# Patient Record
Sex: MALE | Race: BLACK OR AFRICAN AMERICAN | Employment: OTHER | URBAN - METROPOLITAN AREA
[De-identification: names, ages, dates, MRNs, and addresses within clinical notes are randomized per-mention and may not be internally consistent; named-entity substitution may affect disease eponyms.]

---

## 2019-05-02 ENCOUNTER — THERAPY VISIT (OUTPATIENT)
Dept: PHYSICAL THERAPY | Facility: CLINIC | Age: 74
End: 2019-05-02
Payer: COMMERCIAL

## 2019-05-02 DIAGNOSIS — M54.2 NECK PAIN: ICD-10-CM

## 2019-05-02 PROCEDURE — 97112 NEUROMUSCULAR REEDUCATION: CPT | Mod: GP | Performed by: PHYSICAL THERAPIST

## 2019-05-02 PROCEDURE — 97161 PT EVAL LOW COMPLEX 20 MIN: CPT | Mod: GP | Performed by: PHYSICAL THERAPIST

## 2019-05-02 PROCEDURE — 97110 THERAPEUTIC EXERCISES: CPT | Mod: GP | Performed by: PHYSICAL THERAPIST

## 2019-05-02 NOTE — PROGRESS NOTES
[facility] Initial Evaluation  Subjective:  The history is provided by the patient. No  was used.   Ramiro Magaña is a 73 year old male with a cervical spine condition.  Condition occurred with:  Insidious onset.  Condition occurred: for unknown reasons.  This is a recurrent condition  Patient reports that he had insidious onset of left sided neck pain about 4 weeks ago. MD order from 4-25-19. He does have a history of neck pain in the past, but that was more right sided per patient. .    Patient reports pain:  Cervical left side and central cervical spine.  Radiates to:  Shoulder left (L UT).  Pain is described as cramping and aching and is constant and reported as 5/10.  Associated symptoms:  Tingling and loss of motion/stiffness (tingling in the L UT region). Pain is the same all the time.  Symptoms are exacerbated by driving, rotating head and looking up or down (turn to R with 6/10 pain, read nica 1 hour with 6-7/10 pain) and relieved by activity/movement and heat.  Since onset symptoms are gradually improving.        General health as reported by patient is good.  Pertinent medical history includes:  Smoking, sleep disorder/apnea and thyroid problems.  Medical allergies: no.  Other surgeries include:  No.  Current medications:  Anti-inflammatory.  Current occupation is retired.        Barriers include:  None as reported by the patient.    Red flags:  None as reported by the patient.                        Objective:  System              Cervical/Thoracic Evaluation    AROM:  AROM Cervical:    Flexion:            WNL and tight/ache  Extension:       Sign loss and increase  Rotation:         Left: mod loss and ache     Right: mod to sign loss and inrease  Side Bend:      Left: mod loss and tight/ache     Right:  Sign loss and ache      Headaches: none (occipital HA at night)    DTR's:  normal          Cervical Dermatomes:  normal                    Cervical Palpation:     Tenderness present at Left:    Upper Trap; Levator and Erector Spinae  Tenderness present at Right:    Upper Trap and Erector Spinae        Cord Sign:  normal         Shoulder Evaluation:  ROM:  AROM:  : L shoulder AROM equal to R with slight ache end range full elevation.                                  Strength:        Abduction:  Left: 4+/5   Pain:+    Right: 5-/5      Pain:-    Internal Rotation:  Left:5-/5      Pain:-/+                                                         Tam Cervical Evaluation    Posture:  Sitting: poor  Standing: fair  Protruding Head: yes  Wry Neck: no  Correction of Posture: better    Movement Loss:      Retraction (RET): mod            Test Movements:      RET: During: no effect  After: no effect    Repeat RET: During: decreases  After: better  Mechanical Response: IncROM                                                                     ROS    Assessment/Plan:    Patient is a 73 year old male with cervical complaints.    Patient has the following significant findings with corresponding treatment plan.                Diagnosis 1:  cervical derangement  Pain -  hot/cold therapy, mechanical traction, manual therapy, self management, education, directional preference exercise and home program  Decreased ROM/flexibility - manual therapy, therapeutic exercise and home program  Decreased joint mobility - manual therapy, therapeutic exercise and home program  Decreased strength - therapeutic exercise, therapeutic activities and home program  Impaired muscle performance - neuro re-education and home program  Decreased function - therapeutic activities and home program  Impaired posture - neuro re-education and home program    Therapy Evaluation Codes:   1) History comprised of:   Personal factors that impact the plan of care:      None.    Comorbidity factors that impact the plan of care are:      None.     Medications impacting care: None.  2) Examination of Body Systems comprised  of:   Body structures and functions that impact the plan of care:      Cervical spine.   Activity limitations that impact the plan of care are:      Driving, Lifting, Reading/Computer work and Sleeping.  3) Clinical presentation characteristics are:   Stable/Uncomplicated.  4) Decision-Making    Low complexity using standardized patient assessment instrument and/or measureable assessment of functional outcome.  Cumulative Therapy Evaluation is: Low complexity.    Previous and current functional limitations:  (See Goal Flow Sheet for this information)    Short term and Long term goals: (See Goal Flow Sheet for this information)     Communication ability:  Patient appears to be able to clearly communicate and understand verbal and written communication and follow directions correctly.  Treatment Explanation - The following has been discussed with the patient:   RX ordered/plan of care  Anticipated outcomes  Possible risks and side effects  This patient would benefit from PT intervention to resume normal activities.   Rehab potential is good.    Frequency:  1 X week, once daily  Duration:  for 12 weeks  Discharge Plan:  Achieve all LTG.  Independent in home treatment program.  Reach maximal therapeutic benefit.    Please refer to the daily flowsheet for treatment today, total treatment time and time spent performing 1:1 timed codes.

## 2019-05-02 NOTE — LETTER
[facility]  [address]  [facility] [address]  [phone]    May 3, 2019    Re: Ramiro Magaña   :   1945  MRN:  [id number]   REFERRING PHYSICIAN:   Rich Shine    [facility]    Date of Initial Evaluation:  2019  Visits:  Rxs Used: 1  Reason for Referral:  Neck pain    EVALUATION SUMMARY    [facility]  Subjective:  The history is provided by the patient. No  was used.   Ramiro Magaña is a 73 year old male with a cervical spine condition.  Condition occurred with:  Insidious onset.  Condition occurred: for unknown reasons.  This is a recurrent condition  Patient reports that he had insidious onset of left sided neck pain about 4 weeks ago. MD order from 19. He does have a history of neck pain in the past, but that was more right sided per patient. .    Patient reports pain:  Cervical left side and central cervical spine.  Radiates to:  Shoulder left (L UT).  Pain is described as cramping and aching and is constant and reported as 5/10.  Associated symptoms:  Tingling and loss of motion/stiffness (tingling in the L UT region). Pain is the same all the time.  Symptoms are exacerbated by driving, rotating head and looking up or down (turn to R with 6/10 pain, read nica 1 hour with 6-7/10 pain) and relieved by activity/movement and heat.  Since onset symptoms are gradually improving. General health as reported by patient is good.  Pertinent medical history includes:  Smoking, sleep disorder/apnea and thyroid problems.  Medical allergies: no. Other surgeries include: No. Current medications:  Anti-inflammatory.  Current occupation is retired.      Barriers include:  None as reported by the patient.  Red flags:  None as reported by the patient.    Objective:      Cervical/Thoracic Evaluation  AROM:  AROM Cervical:  Flexion:            WNL and  tight/ache  Extension:       Sign loss and increase  Rotation:         Left: mod loss and ache     Right: mod to sign loss and inrease  Side Bend:      Left: mod loss and tight/ache     Right:  Sign loss and ache  Headaches: none (occipital HA at night)  DTR's:  normal  Cervical Dermatomes:  Normal      Re: Ramiro Magaña   :   1945        Cervical Palpation:    Tenderness present at Left:    Upper Trap; Levator and Erector Spinae  Tenderness present at Right:    Upper Trap and Erector Spinae  Cord Sign:  normal       Shoulder Evaluation:  AROM:  : L shoulder AROM equal to R with slight ache end range full elevation.  Strength:    Abduction:  Left: 4+/5   Pain:+    Right: 5-/5      Pain:-  Internal Rotation:  Left:5-/5      Pain:-/+        Tam Cervical Evaluation  Posture:  Sitting: poor  Standing: fair  Protruding Head: yes  Wry Neck: no  Correction of Posture: better  Movement Loss:  Retraction (RET): mod  Test Movements:  RET: During: no effect  After: no effect    Repeat RET: During: decreases  After: better  Mechanical Response: IncROM    Assessment/Plan:    Patient is a 73 year old male with cervical complaints.    Patient has the following significant findings with corresponding treatment plan.                Diagnosis 1:  cervical derangement  Pain -  hot/cold therapy, mechanical traction, manual therapy, self management, education, directional preference exercise and home program  Decreased ROM/flexibility - manual therapy, therapeutic exercise and home program  Decreased joint mobility - manual therapy, therapeutic exercise and home program  Decreased strength - therapeutic exercise, therapeutic activities and home program  Impaired muscle performance - neuro re-education and home program  Decreased function - therapeutic activities and home program  Impaired posture - neuro re-education and home program    Therapy Evaluation Codes:   1) History comprised of:   Personal factors that impact the  plan of care:      None.    Comorbidity factors that impact the plan of care are:      None.     Medications impacting care: None.  2) Examination of Body Systems comprised of:   Body structures and functions that impact the plan of care:      Cervical spine.    Re: Ramiro Magaña   :   1945         Activity limitations that impact the plan of care are:      Driving, Lifting, Reading/Computer work and Sleeping.  3) Clinical presentation characteristics are:   Stable/Uncomplicated.  4) Decision-Making    Low complexity using standardized patient assessment instrument and/or measureable assessment of functional outcome.  Cumulative Therapy Evaluation is: Low complexity.    Previous and current functional limitations:  (See Goal Flow Sheet for this information)    Short term and Long term goals: (See Goal Flow Sheet for this information)     Communication ability:  Patient appears to be able to clearly communicate and understand verbal and written communication and follow directions correctly.  Treatment Explanation - The following has been discussed with the patient:   RX ordered/plan of care  Anticipated outcomes  Possible risks and side effects  This patient would benefit from PT intervention to resume normal activities.   Rehab potential is good.    Frequency:  1 X week, once daily  Duration:  for 12 weeks  Discharge Plan:  Achieve all LTG.  Independent in home treatment program.  Reach maximal therapeutic benefit.    Thank you for your referral.    INQUIRIES  Therapist: Georgiana Joseph, PT   [facility]  [address]  [facility] [address]  Phone: [phone]  Fax: [phone]

## 2019-05-23 ENCOUNTER — THERAPY VISIT (OUTPATIENT)
Dept: PHYSICAL THERAPY | Facility: CLINIC | Age: 74
End: 2019-05-23
Payer: COMMERCIAL

## 2019-05-23 DIAGNOSIS — M54.2 NECK PAIN: ICD-10-CM

## 2019-05-23 PROCEDURE — 97110 THERAPEUTIC EXERCISES: CPT | Mod: GP | Performed by: PHYSICAL THERAPIST

## 2019-05-23 PROCEDURE — 97140 MANUAL THERAPY 1/> REGIONS: CPT | Mod: GP | Performed by: PHYSICAL THERAPIST

## 2019-05-23 NOTE — PROGRESS NOTES
Subjective:  HPI                    Objective:  System    Physical Exam    General     ROS    Assessment/Plan:    SUBJECTIVE  Subjective changes as noted by pt:  Patient reports that he is feeling some better and the exercises seem to help decrease his pain and improve his ROM.  Current Pain level: 3/10(7/10 pain when turns to the L)   Changes in function:  Yes (See Goal flowsheet attached for changes in current functional level)     Adverse reaction to treatment or activity:  None    OBJECTIVE  Changes in objective findings:  Sitting cervical AROM: FB WNL and central ache, BB mod loss and no pain, right rotation mod loss and ache, and L rotation mod to sign loss and ache on the L. B pectoral flexibility poor to fair. Moderate tightness and tenderness to palpation L>R UTs, lev scap and cervical paraspinals.         ASSESSMENT  Ramiro continues to require intervention to meet STG and LTG's: PT  Patient's symptoms are resolving.  Patient is becoming more independent in home exercise program  Response to therapy has shown an improvement in  pain level and function  Progress made towards STG/LTG?  Yes (See Goal flowsheet attached for updates on achievement of STG and LTG)    PLAN  Current treatment program is being advanced to more complex exercises.  The following procedures have been added:  manual therapy    PTA/ATC plan:  N/A    Please refer to the daily flowsheet for treatment today, total treatment time and time spent performing 1:1 timed codes.

## 2019-05-30 ENCOUNTER — THERAPY VISIT (OUTPATIENT)
Dept: PHYSICAL THERAPY | Facility: CLINIC | Age: 74
End: 2019-05-30
Payer: COMMERCIAL

## 2019-05-30 DIAGNOSIS — M54.2 NECK PAIN: ICD-10-CM

## 2019-05-30 PROCEDURE — 97140 MANUAL THERAPY 1/> REGIONS: CPT | Mod: GP | Performed by: PHYSICAL THERAPY ASSISTANT

## 2019-05-30 PROCEDURE — 97110 THERAPEUTIC EXERCISES: CPT | Mod: GP | Performed by: PHYSICAL THERAPY ASSISTANT

## 2019-05-30 PROCEDURE — 97112 NEUROMUSCULAR REEDUCATION: CPT | Mod: GP | Performed by: PHYSICAL THERAPY ASSISTANT

## 2019-06-06 ENCOUNTER — THERAPY VISIT (OUTPATIENT)
Dept: PHYSICAL THERAPY | Facility: CLINIC | Age: 74
End: 2019-06-06
Payer: COMMERCIAL

## 2019-06-06 DIAGNOSIS — M54.2 NECK PAIN: ICD-10-CM

## 2019-06-06 PROCEDURE — 97112 NEUROMUSCULAR REEDUCATION: CPT | Mod: GP | Performed by: PHYSICAL THERAPIST

## 2019-06-06 PROCEDURE — 97110 THERAPEUTIC EXERCISES: CPT | Mod: GP | Performed by: PHYSICAL THERAPIST

## 2019-06-06 PROCEDURE — 97140 MANUAL THERAPY 1/> REGIONS: CPT | Mod: GP | Performed by: PHYSICAL THERAPIST

## 2019-06-06 NOTE — PROGRESS NOTES
SUBJECTIVE  Subjective changes as noted by pt:  Patient reports that he had onset of right hand/thumb pain and swelling about a week ago and has now been on medication for the last 3 days and the doctor told him he has gout. His right thumb is the worse. His neck does feel some better since he first started therapy. The left sided neck pain has gotten much better, but the right side bothers. He has been sleeping on his couch most of the last week due to his hand and he is not a very good posture. He has not done the exercises as much over the last week. He felt the traction helped his pain when he had that 2 sessions ago.      Current Pain level: 5/10   Changes in function:  Yes (See Goal flowsheet attached for changes in current functional level)     Adverse reaction to treatment or activity:  None    OBJECTIVE  Changes in objective findings:  Sitting cervical AROM: FB min loss and increase, BB sign loss and increase pain, right rotation min to mod loss and no change in pain, and left rotation mod loss and ache on the left of the neck. Patient still needs reminders on using neutral postures.         ASSESSMENT  Ramiro continues to require intervention to meet STG and LTG's: PT  Patient is progressing as expected.  Response to therapy has shown an improvement in  ROM  and posture  Progress made towards STG/LTG?  Yes (See Goal flowsheet attached for updates on achievement of STG and LTG)    PLAN  Current treatment program is being advanced to more complex exercises.    PTA/ATC plan:  N/A    Please refer to the daily flowsheet for treatment today, total treatment time and time spent performing 1:1 timed codes.

## 2019-06-21 ENCOUNTER — THERAPY VISIT (OUTPATIENT)
Dept: PHYSICAL THERAPY | Facility: CLINIC | Age: 74
End: 2019-06-21
Payer: COMMERCIAL

## 2019-06-21 DIAGNOSIS — M54.2 NECK PAIN: ICD-10-CM

## 2019-06-21 PROCEDURE — 97112 NEUROMUSCULAR REEDUCATION: CPT | Mod: GP

## 2019-06-21 PROCEDURE — 97110 THERAPEUTIC EXERCISES: CPT | Mod: GP

## 2019-07-11 ENCOUNTER — THERAPY VISIT (OUTPATIENT)
Dept: PHYSICAL THERAPY | Facility: CLINIC | Age: 74
End: 2019-07-11
Payer: COMMERCIAL

## 2019-07-11 DIAGNOSIS — M54.2 NECK PAIN: ICD-10-CM

## 2019-07-11 PROCEDURE — 97140 MANUAL THERAPY 1/> REGIONS: CPT | Mod: GP

## 2019-07-11 PROCEDURE — 97112 NEUROMUSCULAR REEDUCATION: CPT | Mod: GP

## 2019-07-11 PROCEDURE — 97110 THERAPEUTIC EXERCISES: CPT | Mod: GP

## 2019-07-18 ENCOUNTER — THERAPY VISIT (OUTPATIENT)
Dept: PHYSICAL THERAPY | Facility: CLINIC | Age: 74
End: 2019-07-18
Payer: COMMERCIAL

## 2019-07-18 DIAGNOSIS — M54.2 NECK PAIN: ICD-10-CM

## 2019-07-18 PROCEDURE — 97110 THERAPEUTIC EXERCISES: CPT | Mod: GP

## 2019-07-18 PROCEDURE — 97112 NEUROMUSCULAR REEDUCATION: CPT | Mod: GP

## 2019-08-15 ENCOUNTER — THERAPY VISIT (OUTPATIENT)
Dept: PHYSICAL THERAPY | Facility: CLINIC | Age: 74
End: 2019-08-15
Payer: COMMERCIAL

## 2019-08-15 DIAGNOSIS — M54.2 NECK PAIN: ICD-10-CM

## 2019-08-15 PROCEDURE — 97140 MANUAL THERAPY 1/> REGIONS: CPT | Mod: GP | Performed by: PHYSICAL THERAPIST

## 2019-08-15 PROCEDURE — 97112 NEUROMUSCULAR REEDUCATION: CPT | Mod: GP | Performed by: PHYSICAL THERAPIST

## 2019-08-15 PROCEDURE — 97110 THERAPEUTIC EXERCISES: CPT | Mod: GP | Performed by: PHYSICAL THERAPIST

## 2019-08-15 NOTE — PROGRESS NOTES
Subjective:  HPI                    Objective:  System    Physical Exam    General     ROS    Assessment/Plan:    PROGRESS  REPORT    Progress reporting period is from 5-2-19 to 8-15-19.       SUBJECTIVE  Subjective changes noted by patient:  Patient reports that therapy helps a lot. He is no longer having much pain on the left side like he did initially, but now it is in the right UT region.         Current Pain level: 3/10.     Previous pain level was  5/10  .   Changes in function:  Yes (See Goal flowsheet attached for changes in current functional level)  Adverse reaction to treatment or activity: None    OBJECTIVE  Changes noted in objective findings: Sitting cervical AROM: FB WNL with slight ache, BB mod loss and increase on the R, left rotation mod loss and ache on the R, and R rotation mod loss and increase on the R. Patient still needs correction of postures, charlie in sitting.  Moderate tightness and tenderness of R UT/levator scapulae. Pa tightness of lower cervical and thoracic PAs. B shoulder AROM is symmetrical and not painful, but limited in full elevation.     ASSESSMENT/PLAN  Updated problem list and treatment plan: Diagnosis 1:  Cervical derangement  Pain -  manual therapy, self management, education, directional preference exercise and home program  Decreased ROM/flexibility - manual therapy, therapeutic exercise and home program  Decreased joint mobility - manual therapy, therapeutic exercise and home program  Decreased strength - therapeutic exercise, therapeutic activities and home program  Impaired muscle performance - neuro re-education and home program  Decreased function - therapeutic activities and home program  Impaired posture - neuro re-education and home program  STG/LTGs have been met or progress has been made towards goals:  Yes (See Goal flow sheet completed today.)  Assessment of Progress: The patient's condition is improving.  The patient's condition has potential to improve.  Self  Management Plans:  Patient has been instructed in a home treatment program.  Patient  has been instructed in self management of symptoms.    Ramiro continues to require the following intervention to meet STG and LTG's:  PT    Recommendations:  This patient would benefit from continued therapy.     Frequency:  1 X week, once daily  Duration:  for 8 weeks        Please refer to the daily flowsheet for treatment today, total treatment time and time spent performing 1:1 timed codes.

## 2019-08-22 ENCOUNTER — THERAPY VISIT (OUTPATIENT)
Dept: PHYSICAL THERAPY | Facility: CLINIC | Age: 74
End: 2019-08-22
Payer: COMMERCIAL

## 2019-08-22 DIAGNOSIS — M54.2 NECK PAIN: ICD-10-CM

## 2019-08-22 PROCEDURE — 97110 THERAPEUTIC EXERCISES: CPT | Mod: GP | Performed by: PHYSICAL THERAPIST

## 2019-08-22 PROCEDURE — 97140 MANUAL THERAPY 1/> REGIONS: CPT | Mod: GP | Performed by: PHYSICAL THERAPIST

## 2019-09-12 ENCOUNTER — THERAPY VISIT (OUTPATIENT)
Dept: PHYSICAL THERAPY | Facility: CLINIC | Age: 74
End: 2019-09-12
Payer: COMMERCIAL

## 2019-09-12 DIAGNOSIS — M54.2 NECK PAIN: ICD-10-CM

## 2019-09-12 PROCEDURE — 97110 THERAPEUTIC EXERCISES: CPT | Mod: GP

## 2019-09-12 PROCEDURE — 97140 MANUAL THERAPY 1/> REGIONS: CPT | Mod: GP

## 2019-09-26 ENCOUNTER — THERAPY VISIT (OUTPATIENT)
Dept: PHYSICAL THERAPY | Facility: CLINIC | Age: 74
End: 2019-09-26
Payer: COMMERCIAL

## 2019-09-26 DIAGNOSIS — M54.2 NECK PAIN: ICD-10-CM

## 2019-09-26 PROCEDURE — 97140 MANUAL THERAPY 1/> REGIONS: CPT | Mod: GP | Performed by: PHYSICAL THERAPIST

## 2019-09-26 PROCEDURE — 97110 THERAPEUTIC EXERCISES: CPT | Mod: GP | Performed by: PHYSICAL THERAPIST

## 2019-09-26 NOTE — PROGRESS NOTES
SUBJECTIVE  Subjective changes as noted by pt:  Patient reports continued L neck pain. Therapy helps.     Current pain level: 6/10 Changes in function:  Yes (See Goal flowsheet attached for changes in current functional level)     Adverse reaction to treatment or activity:  None    OBJECTIVE  Changes in objective findings:  Sitting cervical AROM: FB WNL and tightness in neck, BB mod loss and tight and ache on the L, mod loss and ache on the R, L rotation mod and ache on the L.      ASSESSMENT  Ramiro continues to require intervention to meet STG and LTG's: PT  Slow overall improvement  Response to therapy has shown lack of progress in  pain level  Progress made towards STG/LTG?  Yes (See Goal flowsheet attached for updates on achievement of STG and LTG)    PLAN  Continue current treatment plan until patient demonstrates readiness to progress to higher level exercises.    PTA/ATC plan:  N/A    Please refer to the daily flowsheet for treatment today, total treatment time and time spent performing 1:1 timed codes.

## 2019-10-03 ENCOUNTER — THERAPY VISIT (OUTPATIENT)
Dept: PHYSICAL THERAPY | Facility: CLINIC | Age: 74
End: 2019-10-03
Payer: COMMERCIAL

## 2019-10-03 DIAGNOSIS — M54.2 NECK PAIN: ICD-10-CM

## 2019-10-03 PROCEDURE — 97140 MANUAL THERAPY 1/> REGIONS: CPT | Mod: GP | Performed by: PHYSICAL THERAPIST

## 2019-10-03 PROCEDURE — 97110 THERAPEUTIC EXERCISES: CPT | Mod: GP | Performed by: PHYSICAL THERAPIST

## 2019-10-10 ENCOUNTER — THERAPY VISIT (OUTPATIENT)
Dept: PHYSICAL THERAPY | Facility: CLINIC | Age: 74
End: 2019-10-10
Payer: COMMERCIAL

## 2019-10-10 DIAGNOSIS — M54.2 NECK PAIN: ICD-10-CM

## 2019-10-10 PROCEDURE — 97110 THERAPEUTIC EXERCISES: CPT | Mod: GP | Performed by: PHYSICAL THERAPIST

## 2019-10-10 PROCEDURE — 97140 MANUAL THERAPY 1/> REGIONS: CPT | Mod: GP | Performed by: PHYSICAL THERAPIST

## 2019-10-10 NOTE — PROGRESS NOTES
SUBJECTIVE  Subjective changes as noted by pt:  Patient reports that the upper trap is doing better, but the neck still bothers quite a bit. He gets the most pain when looking down and L>R rotation of his neck. He is thinking he may want an epidural as that has helped in the past.         Current Pain level: 6/10(neck bothers and shoulder bothers less)   Changes in function:  Yes (See Goal flowsheet attached for changes in current functional level)     Adverse reaction to treatment or activity:  None    OBJECTIVE  Changes in objective findings:  Sitting cervical AROM: FB WNL and increase, BB mod loss and no pain, left rotation mod loss and central ache, and right rotation mod loss and central ache. moderate tightness/tenderness L UT and levator scapulae.          ASSESSMENT  Ramiro continues to require intervention to meet STG and LTG's: PT  Improvement, but not fully resolving.   Response to therapy has shown lack of progress in  function  Progress made towards STG/LTG?  Yes (See Goal flowsheet attached for updates on achievement of STG and LTG)    PLAN  Continue current treatment plan until patient demonstrates readiness to progress to higher level exercises.    PTA/ATC plan:  N/A    Please refer to the daily flowsheet for treatment today, total treatment time and time spent performing 1:1 timed codes.

## 2019-10-17 ENCOUNTER — THERAPY VISIT (OUTPATIENT)
Dept: PHYSICAL THERAPY | Facility: CLINIC | Age: 74
End: 2019-10-17
Payer: COMMERCIAL

## 2019-10-17 DIAGNOSIS — M54.2 NECK PAIN: ICD-10-CM

## 2019-10-17 PROCEDURE — 97110 THERAPEUTIC EXERCISES: CPT | Mod: GP | Performed by: PHYSICAL THERAPIST

## 2019-10-17 PROCEDURE — 97140 MANUAL THERAPY 1/> REGIONS: CPT | Mod: GP | Performed by: PHYSICAL THERAPIST

## 2019-10-17 NOTE — PROGRESS NOTES
Subjective:  HPI                    Objective:  System    Physical Exam    General     ROS    Assessment/Plan:    PROGRESS  REPORT    Progress reporting period is from 8-15-19 to 10-17-19.       SUBJECTIVE  Subjective changes noted by patient:  Patient reports that his left shoulder is doing good now. The left side of his neck still bothers. He feels good for a least a day after he has therapy. He is no longer getting cramping of the L UT.        Current Pain level: 4/10(neck bothers and shoulder bothers less).     Previous pain level was  5/10  .   Changes in function:  Yes (See Goal flowsheet attached for changes in current functional level)  Adverse reaction to treatment or activity: None    OBJECTIVE  Changes noted in objective findings:  Sitting cervical AROM: FB WNL and no change, BB mod loss and slight ache, right rotation mod loss and slight ache on the L, and L rotation mod loss and ache on the L. Moderate tightness/tenderness to palpation L UT, but improving.           ASSESSMENT/PLAN  Updated problem list and treatment plan: Diagnosis 1:  Cervical derangement  Pain -  US, mechanical traction, manual therapy, self management, education, directional preference exercise and home program  Decreased ROM/flexibility - manual therapy, therapeutic exercise and home program  Impaired muscle performance - neuro re-education and home program  Decreased function - therapeutic activities and home program  Impaired posture - neuro re-education and home program  STG/LTGs have been met or progress has been made towards goals:  Yes (See Goal flow sheet completed today.)  Assessment of Progress: The patient's condition is improving.  The patient's condition has potential to improve.  Self Management Plans:  Patient has been instructed in a home treatment program.  Patient  has been instructed in self management of symptoms.    Ramiro continues to require the following intervention to meet STG and LTG's:   PT    Recommendations:  This patient would benefit from continued therapy.     Frequency:  1 X week, once daily  Duration:  for 8 weeks        Please refer to the daily flowsheet for treatment today, total treatment time and time spent performing 1:1 timed codes.

## 2019-12-19 PROBLEM — M54.2 NECK PAIN: Status: RESOLVED | Noted: 2019-05-02 | Resolved: 2019-12-19

## 2019-12-19 NOTE — PROGRESS NOTES
Patient did not return for further treatment and no additional progress was noted.  Please refer to the progress note and goal flowsheet completed on 10/17/19 for discharge information.

## 2020-02-04 ENCOUNTER — THERAPY VISIT (OUTPATIENT)
Dept: PHYSICAL THERAPY | Facility: CLINIC | Age: 75
End: 2020-02-04
Payer: COMMERCIAL

## 2020-02-04 DIAGNOSIS — G89.29 CHRONIC NECK PAIN: ICD-10-CM

## 2020-02-04 DIAGNOSIS — M54.2 CHRONIC NECK PAIN: ICD-10-CM

## 2020-02-04 PROCEDURE — 97110 THERAPEUTIC EXERCISES: CPT | Mod: GP | Performed by: PHYSICAL THERAPIST

## 2020-02-04 PROCEDURE — 97164 PT RE-EVAL EST PLAN CARE: CPT | Mod: GP | Performed by: PHYSICAL THERAPIST

## 2020-02-04 PROCEDURE — 97140 MANUAL THERAPY 1/> REGIONS: CPT | Mod: GP | Performed by: PHYSICAL THERAPIST

## 2020-02-04 NOTE — PROGRESS NOTES
Subjective:  HPI                    Objective:  System    Physical Exam    General     ROS    Assessment/Plan:    PROGRESS  REPORT    Progress reporting period is from 10-17-19 to 2-4-20.       SUBJECTIVE  Subjective changes noted by patient: Patient reports that he has constant numbness now in the L upper trap region that he noticed over the last couple of weeks. He has been sick the last couple of weeks and still does not have a lot of energy yet. He has about the same amount of pain as when last seen in October. He has been doing some of his exercises at home since last here for therapy. He now has intermittent pain in the back of the neck and into the left UT region. His shoulder still bothers him too.  His doctor wanted him to return to therapy and he wants to as well.        Current pain level is 6/10  .     Previous pain level was  4/10  .   Changes in function:  Yes (See Goal flowsheet attached for changes in current functional level)  Adverse reaction to treatment or activity: None    OBJECTIVE  Changes noted in objective findings:  Sitting cervical AROM: Sitting cervical AROM: FB WNL and slight ache, BB mod loss and no change, R rotation mod loss and ache/tight on the L , L rotation mod loss and central ache, and R SB mod loss and no change, and L SB mod loss and ache on the R. Shoulder testing not done today.           ASSESSMENT/PLAN  Updated problem list and treatment plan: Diagnosis 1:  Chronic neck/L shoulder pain  Pain -  US, mechanical traction, manual therapy, self management, education, directional preference exercise and home program  Decreased ROM/flexibility - manual therapy, therapeutic exercise and home program  Decreased joint mobility - manual therapy, therapeutic exercise and home program  Impaired muscle performance - neuro re-education and home program  Decreased function - therapeutic activities and home program  Impaired posture - neuro re-education and home program  STG/LTGs have been  met or progress has been made towards goals:  Yes (See Goal flow sheet completed today.)  Assessment of Progress: The patient's condition has exacerbated.  Self Management Plans:  Patient has been instructed in a home treatment program.  Patient  has been instructed in self management of symptoms.    Ramiro continues to require the following intervention to meet STG and LTG's:  PT    Recommendations:  This patient would benefit from continued therapy.     Frequency:  1 X week, once daily  Duration:  for 12 weeks        Please refer to the daily flowsheet for treatment today, total treatment time and time spent performing 1:1 timed codes.

## 2020-02-09 PROBLEM — G89.29 CHRONIC NECK PAIN: Status: ACTIVE | Noted: 2020-02-09

## 2020-02-09 PROBLEM — M54.2 CHRONIC NECK PAIN: Status: ACTIVE | Noted: 2020-02-09

## 2020-02-11 ENCOUNTER — THERAPY VISIT (OUTPATIENT)
Dept: PHYSICAL THERAPY | Facility: CLINIC | Age: 75
End: 2020-02-11
Payer: COMMERCIAL

## 2020-02-11 DIAGNOSIS — M54.2 CHRONIC NECK PAIN: ICD-10-CM

## 2020-02-11 DIAGNOSIS — G89.29 CHRONIC NECK PAIN: ICD-10-CM

## 2020-02-11 PROCEDURE — 97110 THERAPEUTIC EXERCISES: CPT | Mod: GP | Performed by: PHYSICAL THERAPIST

## 2020-02-11 PROCEDURE — 97140 MANUAL THERAPY 1/> REGIONS: CPT | Mod: GP | Performed by: PHYSICAL THERAPIST

## 2020-02-24 ENCOUNTER — THERAPY VISIT (OUTPATIENT)
Dept: PHYSICAL THERAPY | Facility: CLINIC | Age: 75
End: 2020-02-24
Payer: COMMERCIAL

## 2020-02-24 DIAGNOSIS — G89.29 CHRONIC NECK PAIN: ICD-10-CM

## 2020-02-24 DIAGNOSIS — M54.2 CHRONIC NECK PAIN: ICD-10-CM

## 2020-02-24 PROCEDURE — 97140 MANUAL THERAPY 1/> REGIONS: CPT | Mod: GP | Performed by: PHYSICAL THERAPIST

## 2020-02-24 PROCEDURE — 97110 THERAPEUTIC EXERCISES: CPT | Mod: GP | Performed by: PHYSICAL THERAPIST

## 2020-02-24 NOTE — PROGRESS NOTES
SUBJECTIVE  Subjective changes as noted by pt:  Patient reports that the pain has improved since coming back to therapy a couple of weeks ago. He is having less pain overall. The pain is now more in the middle of the neck/spine.       Current Pain level: 5/10   Changes in function:  Yes (See Goal flowsheet attached for changes in current functional level)     Adverse reaction to treatment or activity:  None    OBJECTIVE  Changes in objective findings:  Sitting cervical AROM: FB WNL and increase, BB mod loss and no change. L rotation mod loss and slight increase, and R rotation mod loss and increase.         ASSESSMENT  Ramiro continues to require intervention to meet STG and LTG's: PT  Patient's symptoms are resolving.  Patient is progressing as expected.  Response to therapy has shown an improvement in  pain level  Progress made towards STG/LTG?  Yes (See Goal flowsheet attached for updates on achievement of STG and LTG)    PLAN  Continue current treatment plan until patient demonstrates readiness to progress to higher level exercises.    PTA/ATC plan:  N/A    Please refer to the daily flowsheet for treatment today, total treatment time and time spent performing 1:1 timed codes.

## 2020-03-10 ENCOUNTER — THERAPY VISIT (OUTPATIENT)
Dept: PHYSICAL THERAPY | Facility: CLINIC | Age: 75
End: 2020-03-10
Payer: COMMERCIAL

## 2020-03-10 DIAGNOSIS — G89.29 CHRONIC NECK PAIN: ICD-10-CM

## 2020-03-10 DIAGNOSIS — M54.2 CHRONIC NECK PAIN: ICD-10-CM

## 2020-03-10 PROCEDURE — 97140 MANUAL THERAPY 1/> REGIONS: CPT | Mod: GP | Performed by: PHYSICAL THERAPIST

## 2020-03-10 PROCEDURE — 97110 THERAPEUTIC EXERCISES: CPT | Mod: GP | Performed by: PHYSICAL THERAPIST

## 2020-04-07 ENCOUNTER — TELEPHONE (OUTPATIENT)
Dept: PHYSICAL THERAPY | Facility: CLINIC | Age: 75
End: 2020-04-07

## 2020-04-07 DIAGNOSIS — M54.2 CHRONIC NECK PAIN: ICD-10-CM

## 2020-04-07 DIAGNOSIS — G89.29 CHRONIC NECK PAIN: ICD-10-CM

## 2020-04-07 NOTE — TELEPHONE ENCOUNTER
Spoke with patient about his appt on 4/16, and he wishes to wait until the clinic reopens to continue his PT.

## 2020-08-27 ENCOUNTER — THERAPY VISIT (OUTPATIENT)
Dept: PHYSICAL THERAPY | Facility: CLINIC | Age: 75
End: 2020-08-27
Payer: COMMERCIAL

## 2020-08-27 DIAGNOSIS — G89.29 NECK PAIN, CHRONIC: Primary | ICD-10-CM

## 2020-08-27 DIAGNOSIS — G89.29 CHRONIC NECK PAIN: ICD-10-CM

## 2020-08-27 DIAGNOSIS — M54.2 NECK PAIN, CHRONIC: Primary | ICD-10-CM

## 2020-08-27 DIAGNOSIS — M25.511 CHRONIC RIGHT SHOULDER PAIN: ICD-10-CM

## 2020-08-27 DIAGNOSIS — G89.29 CHRONIC RIGHT SHOULDER PAIN: ICD-10-CM

## 2020-08-27 DIAGNOSIS — M54.2 CHRONIC NECK PAIN: ICD-10-CM

## 2020-08-27 PROCEDURE — 97110 THERAPEUTIC EXERCISES: CPT | Mod: GP | Performed by: PHYSICAL THERAPIST

## 2020-08-27 PROCEDURE — 97161 PT EVAL LOW COMPLEX 20 MIN: CPT | Mod: GP | Performed by: PHYSICAL THERAPIST

## 2020-08-27 PROCEDURE — 97140 MANUAL THERAPY 1/> REGIONS: CPT | Mod: GP | Performed by: PHYSICAL THERAPIST

## 2020-08-27 NOTE — PROGRESS NOTES
Subjective:  The history is provided by the patient. No  was used.   Patient Health History  Ramiro Magaña being seen for Chronic Neck and Shoulder pain.     Problem began: 8/27/2020 (last order 1/2020).   Problem occurred: unknown   Pain is reported as 8/10 on pain scale.  General health as reported by patient is good.  Pertinent medical history includes: smoking, sleep disorder/apnea and thyroid problems.   Red flags:  Pain at rest/night.  Medical allergies: none.   Surgeries include:  None.    Current medications:  Pain medication.    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   Primary job tasks include:  Other (helps with general tasks around the house).                  Therapist Generated HPI Evaluation  Problem details: Patient presents to PT today with c/o increase Neck and shoulder pain.  Patient stated in March he was receiving PT and he was doing really good but he was unable to maintain it on his own.  Reports in recent weeks the pain has increased so he followed up with the MD and was told to return to PT..         Type of problem:  Cervical spine.    This is a chronic condition.  Condition occurred with:  Insidious onset.    Patient reports pain:  Cervical right side, cervical left side and central cervical spine.    Pain radiates to:  Shoulder left and shoulder right.     Associated symptoms:  Loss of motion/stiffness. Symptoms are exacerbated by rotating head, looking up or down, lying down and driving  and relieved by rest.          Therapist Generated HPI Evaluation  Problem details: Pt also presents with chronic shoulder pain.  Pt stated the pain affects lifting arms overhead more recently now than before.  .         Type of problem:  Right shoulder.    This is a chronic condition.  Condition occurred with:  Unknown cause.    Patient reports pain:  In the joint, posterior and upper trap.        Associated symptoms:  Painful arc and loss  "of motion/stiffness. Symptoms are exacerbated by lying on extremity, using arm behind back, using arm overhead and lifting  and relieved by activity/movement and rest.            Physical Exam                    Objective:  Standing Alignment:    Cervical/Thoracic:  Forward head and thoracic kyphosis increased  Shoulder/UE:  Rounded shoulders                                  Cervical/Thoracic Evaluation    AROM:  AROM Cervical:    Flexion:          1\" away from chest; pulling/tightness/pain back of neck  Extension:       Mod loss; pain  Rotation:         Left: Mod loss; pain     Right: Mod loss; pain  Side Bend:      Left:     Right:                 Cervical Palpation:    Tenderness present at Left:    Upper Trap; Levator and Suboccipitals  Tenderness present at Right:    Upper Trap; Levator and Suboccipitals      Spinal Segmental Conclusions:    Level:  Hypo at C4, C5, C6 and C7             Shoulder Evaluation:  ROM:  AROM:    Flexion:  Right:  115 (pain)  Extension: Right: 38  Abduction:  Right:  89 (pain)                  Extension/Internal Rotation:  Right:  Back right pocket (pain)          Strength:    Flexion: Right: 4/5     Pain:   Extension:  Right: 5/5    Pain:  Abduction:  Right: 3/5      Pain:-/+                    Palpation:      Right shoulder tenderness present at: Levator; Rhomboids; Upper Trap and Bicipital Groove  Mobility Tests:      Glenohumeral posterior right:  Hypomobile  Glenohumeral inferior right:  Hypomobile                                             General     ROS    Assessment/Plan:    Patient is a 75 year old male with cervical and right side shoulder complaints.    Patient has the following significant findings with corresponding treatment plan.                Diagnosis 1:  Chronic Neck Pain/Shoulder pain  Pain -  hot/cold therapy, US and manual therapy  Decreased ROM/flexibility - manual therapy, therapeutic exercise and therapeutic activity  Decreased joint mobility - manual " therapy, therapeutic exercise and therapeutic activity  Decreased strength - therapeutic exercise, therapeutic activities and home program  Impaired muscle performance - neuro re-education  Decreased function - therapeutic activities and home program  Impaired posture - neuro re-education and therapeutic activities    Therapy Evaluation Codes:   1) History comprised of:   Personal factors that impact the plan of care:      Past/current experiences.    Comorbidity factors that impact the plan of care are:      Sleep disorder/apnea, Smoking and thyroid issues.     Medications impacting care: Anti-inflammatory, High blood pressure and Pain.  2) Examination of Body Systems comprised of:   Body structures and functions that impact the plan of care:      Cervical spine and Shoulder.   Activity limitations that impact the plan of care are:      Driving, Dressing, Lifting and reaching.  3) Clinical presentation characteristics are:   Stable/Uncomplicated.  4) Decision-Making    Low complexity using standardized patient assessment instrument and/or measureable assessment of functional outcome.  Cumulative Therapy Evaluation is: Low complexity.    Previous and current functional limitations:  (See Goal Flow Sheet for this information)    Short term and Long term goals: (See Goal Flow Sheet for this information)     Communication ability:  Patient appears to be able to clearly communicate and understand verbal and written communication and follow directions correctly.  Treatment Explanation - The following has been discussed with the patient:   RX ordered/plan of care  Anticipated outcomes  Possible risks and side effects  This patient would benefit from PT intervention to resume normal activities.   Rehab potential is good.    Frequency:  1 X week, once daily  Duration:  for 8 weeks  Discharge Plan:  Achieve all LTG.  Independent in home treatment program.  Reach maximal therapeutic benefit.    Please refer to the daily  flowsheet for treatment today, total treatment time and time spent performing 1:1 timed codes.

## 2020-08-28 PROBLEM — M54.2 CHRONIC NECK PAIN: Status: RESOLVED | Noted: 2020-02-09 | Resolved: 2020-08-28

## 2020-08-28 PROBLEM — G89.29 CHRONIC RIGHT SHOULDER PAIN: Status: ACTIVE | Noted: 2020-08-28

## 2020-08-28 PROBLEM — G89.29 CHRONIC NECK PAIN: Status: RESOLVED | Noted: 2020-02-09 | Resolved: 2020-08-28

## 2020-08-28 PROBLEM — M54.2 NECK PAIN, CHRONIC: Status: ACTIVE | Noted: 2020-08-28

## 2020-08-28 PROBLEM — M25.511 CHRONIC RIGHT SHOULDER PAIN: Status: ACTIVE | Noted: 2020-08-28

## 2020-08-28 PROBLEM — G89.29 NECK PAIN, CHRONIC: Status: ACTIVE | Noted: 2020-08-28

## 2020-09-03 ENCOUNTER — THERAPY VISIT (OUTPATIENT)
Dept: PHYSICAL THERAPY | Facility: CLINIC | Age: 75
End: 2020-09-03
Payer: COMMERCIAL

## 2020-09-03 DIAGNOSIS — G89.29 NECK PAIN, CHRONIC: ICD-10-CM

## 2020-09-03 DIAGNOSIS — M54.2 NECK PAIN, CHRONIC: ICD-10-CM

## 2020-09-03 DIAGNOSIS — M25.511 CHRONIC RIGHT SHOULDER PAIN: ICD-10-CM

## 2020-09-03 DIAGNOSIS — G89.29 CHRONIC RIGHT SHOULDER PAIN: ICD-10-CM

## 2020-09-03 PROCEDURE — 97140 MANUAL THERAPY 1/> REGIONS: CPT | Mod: GP | Performed by: PHYSICAL THERAPIST

## 2020-09-03 PROCEDURE — 97112 NEUROMUSCULAR REEDUCATION: CPT | Mod: GP | Performed by: PHYSICAL THERAPIST

## 2020-09-03 PROCEDURE — 97110 THERAPEUTIC EXERCISES: CPT | Mod: GP | Performed by: PHYSICAL THERAPIST

## 2020-09-10 ENCOUNTER — THERAPY VISIT (OUTPATIENT)
Dept: PHYSICAL THERAPY | Facility: CLINIC | Age: 75
End: 2020-09-10
Payer: COMMERCIAL

## 2020-09-10 DIAGNOSIS — M25.511 CHRONIC RIGHT SHOULDER PAIN: ICD-10-CM

## 2020-09-10 DIAGNOSIS — G89.29 NECK PAIN, CHRONIC: ICD-10-CM

## 2020-09-10 DIAGNOSIS — M54.2 NECK PAIN, CHRONIC: ICD-10-CM

## 2020-09-10 DIAGNOSIS — G89.29 CHRONIC RIGHT SHOULDER PAIN: ICD-10-CM

## 2020-09-10 PROCEDURE — 97140 MANUAL THERAPY 1/> REGIONS: CPT | Mod: GP

## 2020-09-10 PROCEDURE — 97110 THERAPEUTIC EXERCISES: CPT | Mod: GP

## 2020-09-24 ENCOUNTER — THERAPY VISIT (OUTPATIENT)
Dept: PHYSICAL THERAPY | Facility: CLINIC | Age: 75
End: 2020-09-24
Payer: COMMERCIAL

## 2020-09-24 DIAGNOSIS — M54.2 NECK PAIN, CHRONIC: ICD-10-CM

## 2020-09-24 DIAGNOSIS — G89.29 NECK PAIN, CHRONIC: ICD-10-CM

## 2020-09-24 DIAGNOSIS — G89.29 CHRONIC RIGHT SHOULDER PAIN: ICD-10-CM

## 2020-09-24 DIAGNOSIS — M25.511 CHRONIC RIGHT SHOULDER PAIN: ICD-10-CM

## 2020-09-24 PROCEDURE — 97140 MANUAL THERAPY 1/> REGIONS: CPT | Mod: GP | Performed by: PHYSICAL THERAPIST

## 2020-09-24 PROCEDURE — 97112 NEUROMUSCULAR REEDUCATION: CPT | Mod: GP | Performed by: PHYSICAL THERAPIST

## 2020-09-24 PROCEDURE — 97110 THERAPEUTIC EXERCISES: CPT | Mod: GP | Performed by: PHYSICAL THERAPIST

## 2020-10-14 ENCOUNTER — THERAPY VISIT (OUTPATIENT)
Dept: PHYSICAL THERAPY | Facility: CLINIC | Age: 75
End: 2020-10-14
Payer: COMMERCIAL

## 2020-10-14 DIAGNOSIS — M25.511 CHRONIC RIGHT SHOULDER PAIN: ICD-10-CM

## 2020-10-14 DIAGNOSIS — G89.29 CHRONIC RIGHT SHOULDER PAIN: ICD-10-CM

## 2020-10-14 DIAGNOSIS — M54.2 NECK PAIN, CHRONIC: ICD-10-CM

## 2020-10-14 DIAGNOSIS — G89.29 NECK PAIN, CHRONIC: ICD-10-CM

## 2020-10-14 PROCEDURE — 97110 THERAPEUTIC EXERCISES: CPT | Mod: GP | Performed by: PHYSICAL THERAPIST

## 2020-10-14 PROCEDURE — 97035 APP MDLTY 1+ULTRASOUND EA 15: CPT | Mod: GP | Performed by: PHYSICAL THERAPIST

## 2020-10-21 ENCOUNTER — THERAPY VISIT (OUTPATIENT)
Dept: PHYSICAL THERAPY | Facility: CLINIC | Age: 75
End: 2020-10-21
Payer: COMMERCIAL

## 2020-10-21 DIAGNOSIS — G89.29 CHRONIC RIGHT SHOULDER PAIN: ICD-10-CM

## 2020-10-21 DIAGNOSIS — G89.29 NECK PAIN, CHRONIC: ICD-10-CM

## 2020-10-21 DIAGNOSIS — M54.2 NECK PAIN, CHRONIC: ICD-10-CM

## 2020-10-21 DIAGNOSIS — M25.511 CHRONIC RIGHT SHOULDER PAIN: ICD-10-CM

## 2020-10-21 PROCEDURE — 97140 MANUAL THERAPY 1/> REGIONS: CPT | Mod: GP | Performed by: PHYSICAL THERAPIST

## 2020-10-21 PROCEDURE — 97035 APP MDLTY 1+ULTRASOUND EA 15: CPT | Mod: GP | Performed by: PHYSICAL THERAPIST

## 2020-10-21 PROCEDURE — 97110 THERAPEUTIC EXERCISES: CPT | Mod: GP | Performed by: PHYSICAL THERAPIST

## 2020-11-05 ENCOUNTER — THERAPY VISIT (OUTPATIENT)
Dept: PHYSICAL THERAPY | Facility: CLINIC | Age: 75
End: 2020-11-05
Payer: COMMERCIAL

## 2020-11-05 DIAGNOSIS — M54.2 NECK PAIN, CHRONIC: ICD-10-CM

## 2020-11-05 DIAGNOSIS — G89.29 NECK PAIN, CHRONIC: ICD-10-CM

## 2020-11-05 DIAGNOSIS — G89.29 CHRONIC RIGHT SHOULDER PAIN: ICD-10-CM

## 2020-11-05 DIAGNOSIS — M25.511 CHRONIC RIGHT SHOULDER PAIN: ICD-10-CM

## 2020-11-05 PROCEDURE — 97035 APP MDLTY 1+ULTRASOUND EA 15: CPT | Mod: GP

## 2020-11-05 PROCEDURE — 97140 MANUAL THERAPY 1/> REGIONS: CPT | Mod: GP

## 2021-01-31 PROBLEM — M54.2 NECK PAIN, CHRONIC: Status: RESOLVED | Noted: 2020-08-28 | Resolved: 2021-01-31

## 2021-01-31 PROBLEM — G89.29 CHRONIC RIGHT SHOULDER PAIN: Status: RESOLVED | Noted: 2020-08-28 | Resolved: 2021-01-31

## 2021-01-31 PROBLEM — M25.511 CHRONIC RIGHT SHOULDER PAIN: Status: RESOLVED | Noted: 2020-08-28 | Resolved: 2021-01-31

## 2021-01-31 PROBLEM — G89.29 NECK PAIN, CHRONIC: Status: RESOLVED | Noted: 2020-08-28 | Resolved: 2021-01-31

## 2021-01-31 NOTE — PROGRESS NOTES
Discharge Note    Progress reporting period is from last progress note on   to Nov 5, 2020.    Ramiro failed to follow up and current status is unknown.  Please see information below for last relevant information on current status.  Patient seen for 7 visits.    SUBJECTIVE  Subjective changes noted by patient:  pt is in a hurry today, he thought his appt was earlier and has to get to a [id number] program.   .  Current pain level is 4/10.     Previous pain level was  8/10.   Changes in function:  Yes (See Goal flowsheet attached for changes in current functional level)  Adverse reaction to treatment or activity: None    OBJECTIVE  Changes noted in objective findings: tightness/tenderness charlie right upper trap and cervcial mm's / pt very pleased with the decrease in spams after US      ASSESSMENT/PLAN  Diagnosis: Neck/ L shoulder pain   Updated problem list and treatment plan:   Pain - HEP  STG/LTGs have been met or progress has been made towards goals:  Yes, please see goal flowsheet for most current information  Assessment of Progress: current status is unknown.    Last current status:     Self Management Plans:  HEP  I have re-evaluated this patient and find that the nature, scope, duration and intensity of the therapy is appropriate for the medical condition of the patient.  Ramiro continues to require the following intervention to meet STG and LTG's:  HEP.    Recommendations:  Discharge with current home program.  Patient to follow up with MD as needed.    Please refer to the daily flowsheet for treatment today, total treatment time and time spent performing 1:1 timed codes.

## 2021-11-24 ENCOUNTER — THERAPY VISIT (OUTPATIENT)
Dept: PHYSICAL THERAPY | Facility: CLINIC | Age: 76
End: 2021-11-24
Payer: COMMERCIAL

## 2021-11-24 DIAGNOSIS — G89.29 CHRONIC RIGHT SHOULDER PAIN: Primary | ICD-10-CM

## 2021-11-24 DIAGNOSIS — M54.2 CHRONIC NECK PAIN: ICD-10-CM

## 2021-11-24 DIAGNOSIS — G89.29 CHRONIC NECK PAIN: ICD-10-CM

## 2021-11-24 DIAGNOSIS — M25.511 CHRONIC RIGHT SHOULDER PAIN: Primary | ICD-10-CM

## 2021-11-24 PROCEDURE — 97161 PT EVAL LOW COMPLEX 20 MIN: CPT | Mod: GP | Performed by: PHYSICAL THERAPIST

## 2021-11-24 PROCEDURE — 97110 THERAPEUTIC EXERCISES: CPT | Mod: GP | Performed by: PHYSICAL THERAPIST

## 2021-11-24 NOTE — PROGRESS NOTES
Physical Therapy Initial Evaluation  Subjective:    Therapist Generated HPI Evaluation  Problem details: Patient reports a long history of right shoulder and neck pain as long as he can remember. He reports no mechanism, just a gradual onset of neck and right shoulder pain. He was being treated for this for many years which was helpful, but he stopped going to PT during 2020 due to covid 19 precautions and restrictions. He also stopped doing his exercises during this time. He is back today to restart a shoulder and neck exercise routine.  .         Type of problem:  Right shoulder (neck).    This is a chronic condition.  Condition occurred with:  Unknown cause.      Pain is described as aching and is constant.  Pain is worse during the day.  Since onset symptoms are gradually worsening.  Associated symptoms:  Loss of motion/stiffness and loss of strength. Symptoms are exacerbated by carrying, lifting, lying on extremity, using arm overhead, using arm behind back and using arm at shoulder level  Relieved by: tylenol.  Special tests included:  X-ray (outside of [facility]).  Previous treatment includes physical therapy. There was moderate improvement following previous treatment.  Work activity restrictions: retired.  Barriers include:  None as reported by patient.    Therapist Generated HPI Evaluation         Type of problem:  Cervical spine.      Condition occurred with:  Insidious onset.  Where condition occurred: for unknown reasons.  Patient reports pain:  Cervical right side.  and is constant.  Pain is worse during the day.  Since onset symptoms are gradually worsening.  Associated symptoms:  Loss of motion/stiffness and loss of strength. Symptoms are exacerbated by rotating head, lifting and carrying  Relieved by: tylenol.  Special tests included:  X-ray.    Work activity restrictions: retired.  Barriers include:  None as reported by patient.    Patient Health History  Ramiro Magaña being seen for  Neck/shoulder.          Pain is reported as 6/10 on pain scale.    Pertinent medical history includes: high blood pressure, smoking, sleep disorder/apnea and depression.                   Primary job tasks include:  Driving and prolonged sitting.                                    Objective:  Standing Alignment:    Cervical/Thoracic:  Forward head  Shoulder/UE:  Rounded shoulders                                  Cervical/Thoracic Evaluation    AROM:  AROM Cervical:    Flexion:            40 deg, painful  Extension:       30 deg, painful  Rotation:         Left: 30 deg, painful     Right: 30 deg, painful  Side Bend:      Left: 20 deg, painful     Right:  20 deg, painful      Headaches: cervical (2-3 times per week)  Cervical Myotomes:  Cervical myotomes: grossly 4+/5 bilaterally.                        Cervical Palpation:    Tenderness present at Left:    Rhomboids; Upper Trap; Levator; Erector Spinae and Suboccipitals  Tenderness present at Right:    Rhomboids; Upper Trap; Levator; Erector Spinae; Facet (C4/5) and Suboccipitals      Spinal Segmental Conclusions:    Level:  Hypo at C5, C6, C4, C3, C2 and C1             Shoulder Evaluation:  ROM:  AROM:    Flexion:  Right:  90 deg  Extension: Right: 20 deg  Abduction:  Right:  75 deg  Adduction:   Right:  WNL  Internal Rotation:  Right:  Back pocket  External Rotation:  Right:  40 deg with arm adducted      Elbow Flexion:  Right:  WNL  Elbow Extension:  Right:  WNL              Strength:  : grossly 4/5 throughout right shoulder. grossly 4+/5 throughout left shoulder.                      Stability Testing:  not assessed      Special Tests:      Right shoulder positive for the following special tests:Impingement and Rotator cuff tear  Palpation:      Right shoulder tenderness present at: Upper Trap and Bicipital Groove                                     General     ROS    Assessment/Plan:    Patient is a 76 year old male with cervical and right side shoulder  complaints.    Patient has the following significant findings with corresponding treatment plan.                Diagnosis 1:  Right shoulder pain; neck pain  Pain -  hot/cold therapy, manual therapy, splint/taping/bracing/orthotics, self management, education and home program  Decreased ROM/flexibility - manual therapy, therapeutic exercise, therapeutic activity and home program  Decreased joint mobility - manual therapy, therapeutic exercise, therapeutic activity and home program  Decreased strength - therapeutic exercise, therapeutic activities and home program  Decreased function - therapeutic activities and home program  Impaired posture - neuro re-education, therapeutic activities and home program    Therapy Evaluation Codes:   1) History comprised of:   Personal factors that impact the plan of care:      Time since onset of symptoms.    Comorbidity factors that impact the plan of care are:      None.     Medications impacting care: None.  2) Examination of Body Systems comprised of:   Body structures and functions that impact the plan of care:      Cervical spine and Shoulder.   Activity limitations that impact the plan of care are:      Bathing, Dressing, Lifting, Sleeping and Laying down.  3) Clinical presentation characteristics are:   Stable/Uncomplicated.  4) Decision-Making    Low complexity using standardized patient assessment instrument and/or measureable assessment of functional outcome.  Cumulative Therapy Evaluation is: Low complexity.    Previous and current functional limitations:  (See Goal Flow Sheet for this information)    Short term and Long term goals: (See Goal Flow Sheet for this information)     Communication ability:  Patient appears to be able to clearly communicate and understand verbal and written communication and follow directions correctly.  Treatment Explanation - The following has been discussed with the patient:   RX ordered/plan of care  Anticipated outcomes  Possible risks  and side effects  This patient would benefit from PT intervention to resume normal activities.   Rehab potential is good.    Frequency:  1 X week, once daily  Duration:  for 8 weeks  Discharge Plan:  Achieve all LTG.  Independent in home treatment program.  Reach maximal therapeutic benefit.    Please refer to the daily flowsheet for treatment today, total treatment time and time spent performing 1:1 timed codes.

## 2021-11-24 NOTE — PROGRESS NOTES
[facility]    OUTPATIENT Physical Therapy ORTHOPEDIC EVALUATION  PLAN OF TREATMENT FOR OUTPATIENT REHABILITATION  (COMPLETE FOR INITIAL CLAIMS ONLY)  Patient's Last Name, First Name, M.I.  YOB: 1945  Ramiro Magaña    Provider s Name:  [facility]   Medical Record No.  [id number]   Start of Care Date:  11/24/21   Onset Date:   11/17/21 (date of PT order)   Type:     _X__PT   ___OT Medical Diagnosis:    Encounter Diagnoses   Name Primary?     Chronic right shoulder pain Yes     Chronic neck pain         Treatment Diagnosis:  right shoulder/neck pain        Goals:     11/24/21 0500   Body Part   Goals listed below are for Right shoulder/neck   Goal #1   Goal #1 reaching   Previous Functional Level No restrictions   Current Functional Level Can reach ;to shoulder level   Performance level with >5/10 right shouler pain   STG Target Performance Reach ;overhead   Performance level >90 deg with <5/10 left shoulder pain   Rationale for independent personal hygiene;for dressing;for bathing;for meal preparation;for job requirements in their work place;for safe driving, hook seat belt, reach shift lever and turn signal, using both hands on steering wheel   Due date 12/22/21   LTG Target Performance Reach;overhead   Performance Level >120 deg with <3/10 right shoulder pain   Rationale for independent personal hygiene;for dressing;for bathing;for meal preparation;for safe driving, hook seat belt, reach shift lever and turn signal, using both hands on steering wheel   Due date 01/26/22       Therapy Frequency:  1 time per week  Predicted Duration of Therapy Intervention:  8 weeks    Monty Chacko, PT                 I CERTIFY THE NEED FOR THESE SERVICES FURNISHED UNDER        THIS PLAN OF TREATMENT AND WHILE UNDER MY CARE .             Physician Signature                Date    X_____________________________________________________                             Certification Date From:  11/24/21   Certification Date To:  02/22/22    Referring Provider:  Rich Shine    Initial Assessment        See Epic Evaluation SOC Date: 11/24/21

## 2021-12-01 ENCOUNTER — THERAPY VISIT (OUTPATIENT)
Dept: PHYSICAL THERAPY | Facility: CLINIC | Age: 76
End: 2021-12-01
Payer: COMMERCIAL

## 2021-12-01 DIAGNOSIS — M54.2 CHRONIC NECK PAIN: ICD-10-CM

## 2021-12-01 DIAGNOSIS — G89.29 CHRONIC NECK PAIN: ICD-10-CM

## 2021-12-01 DIAGNOSIS — M25.511 CHRONIC RIGHT SHOULDER PAIN: Primary | ICD-10-CM

## 2021-12-01 DIAGNOSIS — G89.29 CHRONIC RIGHT SHOULDER PAIN: Primary | ICD-10-CM

## 2021-12-01 PROCEDURE — 97140 MANUAL THERAPY 1/> REGIONS: CPT | Mod: GP | Performed by: PHYSICAL THERAPIST

## 2021-12-01 PROCEDURE — 97110 THERAPEUTIC EXERCISES: CPT | Mod: GP | Performed by: PHYSICAL THERAPIST

## 2022-01-01 ENCOUNTER — THERAPY VISIT (OUTPATIENT)
Dept: PHYSICAL THERAPY | Facility: CLINIC | Age: 77
End: 2022-01-01
Payer: COMMERCIAL

## 2022-01-01 DIAGNOSIS — G89.29 CHRONIC RIGHT SHOULDER PAIN: ICD-10-CM

## 2022-01-01 DIAGNOSIS — M25.511 CHRONIC RIGHT SHOULDER PAIN: ICD-10-CM

## 2022-01-01 DIAGNOSIS — M54.12 CERVICAL RADICULOPATHY: Primary | ICD-10-CM

## 2022-01-01 DIAGNOSIS — M25.511 CHRONIC RIGHT SHOULDER PAIN: Primary | ICD-10-CM

## 2022-01-01 DIAGNOSIS — M54.12 CERVICAL RADICULOPATHY: ICD-10-CM

## 2022-01-01 DIAGNOSIS — G89.29 CHRONIC RIGHT SHOULDER PAIN: Primary | ICD-10-CM

## 2022-01-01 PROCEDURE — 97110 THERAPEUTIC EXERCISES: CPT | Mod: GP

## 2022-01-01 PROCEDURE — 97110 THERAPEUTIC EXERCISES: CPT | Mod: GP | Performed by: PHYSICAL THERAPIST

## 2022-01-01 PROCEDURE — 97161 PT EVAL LOW COMPLEX 20 MIN: CPT | Mod: GP | Performed by: PHYSICAL THERAPIST

## 2022-01-01 PROCEDURE — 97140 MANUAL THERAPY 1/> REGIONS: CPT | Mod: GP | Performed by: PHYSICAL THERAPIST

## 2022-01-01 PROCEDURE — 97112 NEUROMUSCULAR REEDUCATION: CPT | Mod: GP

## 2022-01-12 NOTE — PROGRESS NOTES
Discharge Note    Progress reporting period is from last progress note on   to Dec 1, 2021.    Ramiro failed to follow up and current status is unknown.  Please see information below for last relevant information on current status.  Patient seen for 2 visits.    SUBJECTIVE  Subjective changes noted by patient:  Patient reports feeling a little better since last PT session. He reports a pain and tightness in his right upper trap  .  Current pain level is 4/10.     Previous pain level was  5/10.   Changes in function:  Yes (See Goal flowsheet attached for changes in current functional level)  Adverse reaction to treatment or activity: None    OBJECTIVE  Changes noted in objective findings: Right shoulder AROM flexion 130 deg, abd 80 deg. PROM flexion 165 deg  Hypertonic upper traps bilat     ASSESSMENT/PLAN  Diagnosis: right shoulder/neck pain   Updated problem list and treatment plan:   Pain - HEP  Decreased ROM/flexibility - HEP  Decreased function - HEP  Decreased strength - HEP  STG/LTGs have been met or progress has been made towards goals:  Yes, please see goal flowsheet for most current information  Assessment of Progress: current status is unknown.    Last current status: Pt is progressing well   Self Management Plans:  HEP  I have re-evaluated this patient and find that the nature, scope, duration and intensity of the therapy is appropriate for the medical condition of the patient.  Ramiro continues to require the following intervention to meet STG and LTG's:  HEP.    Recommendations:  Discharge with current home program.  Patient to follow up with MD as needed.    Please refer to the daily flowsheet for treatment today, total treatment time and time spent performing 1:1 timed codes.

## 2022-09-15 PROBLEM — M54.12 CERVICAL RADICULOPATHY: Status: ACTIVE | Noted: 2022-01-01

## 2022-09-15 PROBLEM — G89.29 CHRONIC RIGHT SHOULDER PAIN: Status: ACTIVE | Noted: 2022-01-01

## 2022-09-15 PROBLEM — M25.511 CHRONIC RIGHT SHOULDER PAIN: Status: ACTIVE | Noted: 2022-01-01

## 2022-09-15 NOTE — PROGRESS NOTES
Physical Therapy Initial Evaluation  Subjective:  The history is provided by the patient. No  was used.   Patient Health History  Ramiro Magaña being seen for R shoulder and right sided neck pain, chronic.     Problem began: 9/15/2021.   Problem occurred: Gradual increase in pain   Pain is reported as 4/10 on pain scale.  General health as reported by patient is good.  Pertinent medical history includes: heart problems, chemical dependency, numbness/tingling and smoking.   Red flags:  None as reported by patient.         Current medications:  High blood pressure medication, cardiac medication, anti-depressants and pain medication.    Current occupation is Retired.                     Therapist Generated HPI Evaluation  Problem details: History of cervical myelopathy and cervical spondylosis. Has received injections for both neck and shoulder which has helped both pains. He has not been getting either treated lately and has greatly increased pain.     He describes his current pain as quite different from his previous neck issues.         Type of problem:  Right shoulder.    This is a chronic condition.  Condition occurred with:  Unknown cause.  Where condition occurred: for unknown reasons.  Patient reports pain:  Lateral, in the joint, anterior, upper arm and upper trap.  Pain is described as aching and is intermittent.  Pain radiates to:  Upper arm. Pain is worse in the A.M..    Associated symptoms:  Loss of motion/stiffness, painful arc and loss of strength. Symptoms are exacerbated by lying on extremity, using arm at shoulder level, using arm behind back, lifting, carrying and using arm overhead  and relieved by rest (light activity).    Previous treatment includes physical therapy (neck and shoulder). There was moderate improvement following previous treatment.  Work activity restrictions: retired.  Barriers include:  None as reported by patient.                        Objective:  Standing  Alignment:    Cervical/Thoracic:  Forward head and Dowager's hump  Shoulder/UE:  Rounded shoulders                                  Cervical/Thoracic Evaluation    AROM:  AROM Cervical:    Flexion:          Normal  Extension:       Mod loss, + pain into right shoulder  Rotation:         Left: 25     Right: 35  Side Bend:      Left:     Right:       Headaches: none                         Shoulder Evaluation:  ROM:  AROM:    Flexion:  Right:  115  Extension: Right: 60  Abduction:  Right:  100    Internal Rotation:  Right:  Back pocket  External Rotation:  Right:  70                                                                 General     ROS    Assessment/Plan:    Patient is a 77 year old male with cervical and right side shoulder complaints.    Patient has the following significant findings with corresponding treatment plan.                Diagnosis 1:  R shoulder pain and right sided cervical radiculopathy  Pain -  hot/cold therapy, manual therapy, self management, education, directional preference exercise and home program  Decreased ROM/flexibility - manual therapy, therapeutic exercise and home program  Decreased joint mobility - manual therapy, therapeutic exercise and home program  Decreased strength - therapeutic exercise, therapeutic activities and home program  Impaired muscle performance - neuro re-education and home program  Impaired posture - neuro re-education, therapeutic activities and home program    Therapy Evaluation Codes:     Cumulative Therapy Evaluation is: Low complexity.    Previous and current functional limitations:  (See Goal Flow Sheet for this information)    Short term and Long term goals: (See Goal Flow Sheet for this information)     Communication ability:  Patient appears to be able to clearly communicate and understand verbal and written communication and follow directions correctly.  Treatment Explanation - The following has been discussed with the patient:   RX ordered/plan of  care  Anticipated outcomes  Possible risks and side effects  This patient would benefit from PT intervention to resume normal activities.   Rehab potential is good.    Frequency:  1 X week, once daily  Duration:  for 8 weeks  Discharge Plan:  Achieve all LTG.  Independent in home treatment program.  Reach maximal therapeutic benefit.    Please refer to the daily flowsheet for treatment today, total treatment time and time spent performing 1:1 timed codes.

## 2022-09-15 NOTE — PROGRESS NOTES
[facility]    OUTPATIENT Physical Therapy ORTHOPEDIC EVALUATION  PLAN OF TREATMENT FOR OUTPATIENT REHABILITATION  (COMPLETE FOR INITIAL CLAIMS ONLY)  Patient's Last Name, First Name, M.I.  YOB: 1945  Ramiro Magaña    Provider s Name:  [facility]   Medical Record No.  [id number]   Start of Care Date:  09/15/22   Onset Date:   09/15/21   Type:     _X__PT   ___OT Medical Diagnosis:  No diagnosis found.     Treatment Diagnosis:  R shoulder pain with cervical radiculopathy        Goals:     09/15/22 0500   Body Part   Goals listed below are for R shoulder   Goal #1   Goal #1 reaching   Previous Functional Level No restrictions   Current Functional Level Cannot reach ;overhead;behind back;out to the side;across body   Performance level PL 5/10, flexion 110 with compensations   STG Target Performance Reach ;behind back;to shoulder level;overhead;out to the side;across body   Performance level PL 2/10, flex >150 degrees   Rationale for independent personal hygiene;for dressing;for safe driving, hook seat belt, reach shift lever and turn signal, using both hands on steering wheel;for job requirements in their work place;for bathing;for meal preparation   Due date 10/13/22   LTG Target Performance Unrestricted reaching   Performance Level With no pain, bilateral AROM equal   Rationale for independent personal hygiene;for dressing;for job requirements in their work place;for safe driving, hook seat belt, reach shift lever and turn signal, using both hands on steering wheel;for meal preparation;for bathing   Due date 11/10/22       Therapy Frequency:  1x/week  Predicted Duration of Therapy Intervention:  8 weeks    Jeramie Crouch, PT                 I CERTIFY THE NEED FOR THESE SERVICES FURNISHED UNDER        THIS PLAN OF TREATMENT AND WHILE UNDER MY CARE .              Physician Signature               Date    X_____________________________________________________                         Certification Date From:  09/15/22   Certification Date To:  11/10/22    Referring Provider:  Rich Shine    Initial Assessment        See Epic Evaluation SOC Date: 09/15/22

## 2022-10-31 NOTE — PROGRESS NOTES
[facility]    OUTPATIENT Physical Therapy ORTHOPEDIC EVALUATION  PLAN OF TREATMENT FOR OUTPATIENT REHABILITATION  (COMPLETE FOR INITIAL CLAIMS ONLY)  Patient's Last Name, First Name, M.I.  YOB: 1945  Ramiro Magaña    Provider s Name:  [facility]   Medical Record No.  [id number]   Start of Care Date:  09/15/22   Onset Date:   09/15/21   Treatment Diagnosis:  R shoulder pain with cervical radiculopathy Medical Diagnosis:     Cervical radiculopathy  Chronic right shoulder pain       Goals:     10/31/22 0500   Body Part   Goals listed below are for R shoulder   Goal #1   Goal #1 reaching   Previous Functional Level No restrictions   Current Functional Level Can reach ;overhead;behind back   Performance level PL 3/10 135 deg   STG Target Performance Reach ;behind back;to shoulder level;overhead;out to the side;across body   Performance level PL 2/10, flex >150 degrees   Rationale for independent personal hygiene;for dressing;for safe driving, hook seat belt, reach shift lever and turn signal, using both hands on steering wheel;for job requirements in their work place;for bathing;for meal preparation   Due date 11/14/22   LTG Target Performance Unrestricted reaching   Performance Level With no pain, bilateral AROM equal   Rationale for independent personal hygiene;for dressing;for job requirements in their work place;for safe driving, hook seat belt, reach shift lever and turn signal, using both hands on steering wheel;for meal preparation;for bathing   Due date 12/23/22       Therapy Frequency:  1x/week  Predicted Duration of Therapy Intervention:  6 additional weeks    Jeramie Crouch, PT                 I CERTIFY THE NEED FOR THESE SERVICES FURNISHED UNDER        THIS PLAN OF TREATMENT AND WHILE UNDER MY CARE .             Physician Signature                Date    X_____________________________________________________                         Certification Date From:  11/11/22   Certification Date To:  12/23/22    Referring Provider:  Rich Shine    Initial Assessment        See Epic Evaluation SOC Date: 09/15/22

## 2022-12-28 NOTE — PROGRESS NOTES
DISCHARGE SUMMARY    Ramiro Magaña was seen 5 times for evaluation and treatment.  Patient did not return for further treatment and current status is unknown.  Due to short treatment duration, no objective or functional changes were made.  Please see goal flow sheet from episode noted date below and initial evaluation for further information.  Patient is discharged from therapy and therapy episode is resolved as of 12/28/22.      Linked Episodes   Type: Episode: Status: Noted: Resolved: Last update: Updated by:   PHYSICAL THERAPY R shoulder/neck pain 9/15/22 Active 9/15/2022  10/31/2022 10:00 AM Jeramie Crouch, PT      Comments:

## 2023-01-01 ENCOUNTER — TRANSCRIBE ORDERS (OUTPATIENT)
Dept: OTHER | Age: 78
End: 2023-01-01

## 2023-01-01 DIAGNOSIS — G89.29 CHRONIC RIGHT SHOULDER PAIN: Primary | ICD-10-CM

## 2023-01-01 DIAGNOSIS — M25.511 CHRONIC RIGHT SHOULDER PAIN: Primary | ICD-10-CM
